# Patient Record
Sex: MALE | Race: WHITE | NOT HISPANIC OR LATINO | Employment: FULL TIME | URBAN - METROPOLITAN AREA
[De-identification: names, ages, dates, MRNs, and addresses within clinical notes are randomized per-mention and may not be internally consistent; named-entity substitution may affect disease eponyms.]

---

## 2017-01-10 ENCOUNTER — ALLSCRIPTS OFFICE VISIT (OUTPATIENT)
Dept: OTHER | Facility: OTHER | Age: 62
End: 2017-01-10

## 2017-01-10 ENCOUNTER — TRANSCRIBE ORDERS (OUTPATIENT)
Dept: ADMINISTRATIVE | Facility: HOSPITAL | Age: 62
End: 2017-01-10

## 2017-01-10 DIAGNOSIS — H53.2 DIPLOPIA: Primary | ICD-10-CM

## 2017-01-10 DIAGNOSIS — H51.0 PALSY OF CONJUGATE GAZE: ICD-10-CM

## 2017-01-11 ENCOUNTER — TRANSCRIBE ORDERS (OUTPATIENT)
Dept: ADMINISTRATIVE | Facility: HOSPITAL | Age: 62
End: 2017-01-11

## 2017-01-11 DIAGNOSIS — H53.2 DIPLOPIA: Primary | ICD-10-CM

## 2017-01-11 DIAGNOSIS — H51.0 PALSY OF CONJUGATE GAZE: ICD-10-CM

## 2017-01-30 ENCOUNTER — GENERIC CONVERSION - ENCOUNTER (OUTPATIENT)
Dept: OTHER | Facility: OTHER | Age: 62
End: 2017-01-30

## 2017-08-28 ENCOUNTER — GENERIC CONVERSION - ENCOUNTER (OUTPATIENT)
Dept: OTHER | Facility: OTHER | Age: 62
End: 2017-08-28

## 2018-01-11 NOTE — MISCELLANEOUS
Message   Date: 28 Mar 2016 8:52 AM EST, Recorded By: Lionel Ang   Calling For: Rafa Osullivan   Caller: Andrew Jackson, Self   Phone: [phone] (Home), [phone] (Work)   Reason: Medical Complaint   left knee pain persists, refer to [facility], Dr Quyen Meraz, patient to call for an appointment        Active Problems    1  BPH without obstruction/lower urinary tract symptoms (600 00) (N40 0)   2  Cough (786 2) (R05)   3  Diverticulitis (562 11) (K57 92)   4  Hyperlipidemia (272 4) (E78 5)   5  Joint pain, knee (719 46) (M25 569)   6  Lateral epicondylitis of left elbow (726 32) (M77 12)   7  Psoriasis (696 1) (L40 9)   8  Rhinitis (472 0) (J31 0)   9  Screening for colon cancer (V76 51) (Z12 11)   10  Upper respiratory infection (465 9) (J06 9)    Current Meds   1  Fluticasone Propionate 50 MCG/ACT Nasal Suspension; INHALE 2 SPRAYS INTO   EACH NOSTRIL ONE TIME DAILY; Therapy: 88DNS7980 to (Evaluate:13Oct2015)  Requested for: 54WBA6638; Last   Rx:15Jun2015 Ordered   2  Simvastatin 20 MG Oral Tablet; TAKE ONE TABLET BY MOUTH ONCE DAILY AS   DIRECTED; Therapy: 53Mco7489 to (Evaluate:31Mar2016)  Requested for: 05ESI5602; Last   Rx:01Mar2016 Ordered   3  Tamsulosin HCl - 0 4 MG Oral Capsule; TAKE 1 CAPSULE BY MOUTH ONE TIME DAILY; Therapy: 57Hzy6755 to (Evaluate:31Mar2016)  Requested for: 74NWU5049; Last   Rx:01Mar2016 Ordered    Allergies    1   No Known Drug Allergies    Plan  Joint pain, knee    · 2 - Chad HUMPHREYS, Nubia White  (Orthopedic Surgery) Physician Referral  Consult  Status: Active   Requested for: [id number]  Care Summary provided  : Yes    Signatures   Electronically signed by : ADRIANNA Raymond ; Mar 30 2016  8:58AM EST                       (Author)

## 2018-01-15 VITALS
RESPIRATION RATE: 15 BRPM | WEIGHT: 237.5 LBS | TEMPERATURE: 97 F | HEART RATE: 60 BPM | HEIGHT: 70 IN | SYSTOLIC BLOOD PRESSURE: 130 MMHG | DIASTOLIC BLOOD PRESSURE: 80 MMHG | BODY MASS INDEX: 34 KG/M2

## 2018-01-16 NOTE — MISCELLANEOUS
Provider Comments  Provider Comments:   Patient NO SHOW on 8/28/17 at 2:30 pm letter sent to patient        Signatures   Electronically signed by : ADRIANNA David ; Sep  5 2017 12:37PM EST                       (Author)

## 2018-01-16 NOTE — MISCELLANEOUS
Message   Date: 31 Oct 2016 3:23 PM EST, Recorded By: Jaky Mae For: AbranRafa   Caller: Archie Lazo   Phone: [phone] (Home), [phone] (Work)   Reason: General Medical Question   Patient called stating that he was in bed Friday and Saturday with the pain but on Sunday was able to get up and about  Today able to walk, pain improved a little  He still has numbness in his hands  He was wondering if he may try Physical Therapy? As per Dr Ubaldo Suarez, we will give him an order for physical Therapy  Patient understood        Active Problems    1  BPH without obstruction/lower urinary tract symptoms (600 00) (N40 0)   2  Cervical radiculopathy, acute (723 4) (M54 12)   3  Cough (786 2) (R05)   4  Diverticulitis (562 11) (K57 92)   5  Hyperlipidemia (272 4) (E78 5)   6  Joint pain, knee (719 46) (M25 569)   7  Lateral epicondylitis of left elbow (726 32) (M77 12)   8  Need for prophylactic vaccination and inoculation against influenza (V04 81) (Z23)   9  Psoriasis (696 1) (L40 9)   10  Rhinitis (472 0) (J31 0)   11  Screening for colon cancer (V76 51) (Z12 11)   12  Strain of trapezius muscle (840 8) (S46 819A)   13  Upper respiratory infection (465 9) (J06 9)    Current Meds   1  Diclofenac Sodium 75 MG Oral Tablet Delayed Release; Take one tablet bid; Therapy: 43ZGG5663 to (Evaluate:15Ebu2401)  Requested for: 10JYI2883; Last   Rx:27Oct2016 Ordered   2  Methocarbamol 500 MG Oral Tablet; 1 TABLET QID PRN; Therapy: 36DJR5883 to (Marzella Close)  Requested for: 31POZ4054; Last   Rx:27Oct2016 Ordered   3  MethylPREDNISolone 4 MG Oral Tablet Therapy Pack; take as directed; Therapy: 89UBY4268 to (Last Pleasant Broody)  Requested for: 27Oct2016 Ordered   4  Simvastatin 20 MG Oral Tablet; TAKE 1 TABLET BY MOUTH EVERY DAY AS DIRECTED; Therapy: 67Sli5628 to (Evaluate:44Xda0394)  Requested for: 91IFU2070; Last   Rx:14Jun2016 Ordered   5   Tamsulosin HCl - 0 4 MG Oral Capsule; TAKE 1 CAPSULE BY MOUTH EVERY DAY; Therapy: 56Hnf9706 to (Evaluate:54Dpv1557)  Requested for: 30TEV1786; Last   Rx:14Jun2016 Ordered   6  TraMADol HCl - 50 MG Oral Tablet; TAKE 1 TABLET 3 TIMES DAILY AS NEEDED; Therapy: 76QCO2301 to (Evaluate:19Oct2016); Last Rx:12Oct2016 Ordered    Allergies    1   No Known Drug Allergies    Signatures   Electronically signed by : ADRIANNA Ochoa ; Nov 1 2016  8:11AM EST                       (Author)

## 2018-02-12 ENCOUNTER — OFFICE VISIT (OUTPATIENT)
Dept: INTERNAL MEDICINE CLINIC | Facility: CLINIC | Age: 63
End: 2018-02-12
Payer: COMMERCIAL

## 2018-02-12 VITALS
RESPIRATION RATE: 15 BRPM | WEIGHT: 236.4 LBS | HEIGHT: 70 IN | HEART RATE: 76 BPM | TEMPERATURE: 97.3 F | DIASTOLIC BLOOD PRESSURE: 84 MMHG | BODY MASS INDEX: 33.84 KG/M2 | SYSTOLIC BLOOD PRESSURE: 138 MMHG

## 2018-02-12 DIAGNOSIS — N40.0 BPH WITHOUT OBSTRUCTION/LOWER URINARY TRACT SYMPTOMS: ICD-10-CM

## 2018-02-12 DIAGNOSIS — Z12.11 ENCOUNTER FOR SCREENING COLONOSCOPY FOR NON-HIGH-RISK PATIENT: ICD-10-CM

## 2018-02-12 DIAGNOSIS — E78.5 HYPERLIPIDEMIA, UNSPECIFIED HYPERLIPIDEMIA TYPE: Primary | ICD-10-CM

## 2018-02-12 PROCEDURE — 99214 OFFICE O/P EST MOD 30 MIN: CPT | Performed by: INTERNAL MEDICINE

## 2018-02-12 RX ORDER — SIMVASTATIN 20 MG
1 TABLET ORAL DAILY
COMMUNITY
Start: 2011-08-02 | End: 2018-09-10 | Stop reason: SDUPTHER

## 2018-02-12 RX ORDER — TAMSULOSIN HYDROCHLORIDE 0.4 MG/1
1 CAPSULE ORAL DAILY
COMMUNITY
Start: 2011-08-02 | End: 2018-05-13 | Stop reason: SDUPTHER

## 2018-02-12 NOTE — PROGRESS NOTES
[facility] [place]      NAME: Diaz Castro  AGE: 65 y o  SEX: male  : 1955   MRN: [id number]    DATE: 2018  TIME: 3:03 PM    Assessment and Plan   1  Hyperlipidemia, unspecified hyperlipidemia type  The patient is tolerating simvastatin  He will get a lipid profile in the near future  - CBC  - Comprehensive metabolic panel  - Lipid panel    2  BPH without obstruction/lower urinary tract symptoms   his symptoms are well controlled on tamsulosin  - PSA; Future    3  Encounter for screening colonoscopy for non-high-risk patient   the future is now  The patient has been referred to Gastroenterology for colonoscopy  I encouraged him to expedite this  - Ambulatory referral to Gastroenterology; Future      The patient is doing generally well  He has some musculoskeletal issues but these have not really slowed him down  He does not want any particular evaluation or additional treatment for this at the moment  He is on simvastatin for his cholesterol  He is on tamsulosin for BPH  I encouraged him to update his lab work and to undergo colorectal cancer screening  Return to office in:   1 year    Chief Complaint    routine follow-up    History of Present Illness      the patient returned to the office for routine re-evaluation of hyperlipidemia and BPH  Since his last visit he has been feeling reasonably well  He has difficulty with his left knee and saw Orthopedics  He was advised to stop running  He has done so  He does have a rowing machine but does not use it very frequently  He has been having some discomfort in his left hip which she thinks is bursitis  This is not really been holding him back  He has been under some psychological stress of late      The following portions of the patient's history were reviewed and updated as appropriate: allergies, current medications, past family history, past medical history, past social history, past surgical history and problem list     Review of Systems   Review of Systems   Constitutional: Negative  HENT: Negative for congestion, ear pain, postnasal drip, rhinorrhea, sore throat and trouble swallowing  Eyes: Negative for pain, discharge, redness and visual disturbance  Respiratory: Negative for cough, shortness of breath and wheezing  Cardiovascular: Negative  Gastrointestinal: Negative  Endocrine: Negative  Genitourinary: Negative for difficulty urinating, dysuria, frequency, hematuria and urgency  Musculoskeletal: Negative for arthralgias, gait problem, joint swelling and myalgias  Skin: Negative for rash  Neurological: Negative for dizziness, speech difficulty, weakness, light-headedness, numbness and headaches  Hematological: Negative  Psychiatric/Behavioral: Negative for confusion, decreased concentration, dysphoric mood and sleep disturbance  The patient is not nervous/anxious  Active Problem List     Patient Active Problem List   Diagnosis    BPH without obstruction/lower urinary tract symptoms    Hyperlipidemia       Objective   /84 (BP Location: Left arm, Patient Position: Sitting, Cuff Size: Standard)   Pulse 76   Temp (!) 97 3 °F (36 3 °C) (Tympanic)   Resp 15   Ht 5' 10" (1 778 m)   Wt 107 kg (236 lb 6 4 oz)   BMI 33 92 kg/m²     Physical Exam   Constitutional: He is oriented to person, place, and time  He appears well-developed and well-nourished  HENT:   Head: Normocephalic and atraumatic  Neck: Neck supple  No JVD present  No tracheal deviation present  No thyromegaly present  Cardiovascular: Normal rate, regular rhythm, normal heart sounds and intact distal pulses  Exam reveals no gallop and no friction rub  No murmur heard  Pulmonary/Chest: Effort normal and breath sounds normal  He has no wheezes  He has no rales  He exhibits no tenderness  Abdominal: Soft  Bowel sounds are normal  He exhibits no distension and no mass  There is no tenderness   There is no rebound  Musculoskeletal: Normal range of motion  He exhibits no edema or tenderness  Lymphadenopathy:     He has no cervical adenopathy  Neurological: He is alert and oriented to person, place, and time  Skin: Skin is warm and dry  Psychiatric: He has a normal mood and affect   His behavior is normal  Judgment and thought content normal        Pertinent Laboratory/Diagnostic Studies:   no recent labs    Current Medications     Current Outpatient Prescriptions:     simvastatin (ZOCOR) 20 mg tablet, Take 1 tablet by mouth daily, Disp: , Rfl:     tamsulosin (FLOMAX) 0 4 mg, Take 1 capsule by mouth daily, Disp: , Rfl:       Ajay Pino MD

## 2018-05-09 ENCOUNTER — OFFICE VISIT (OUTPATIENT)
Dept: INTERNAL MEDICINE CLINIC | Facility: CLINIC | Age: 63
End: 2018-05-09
Payer: COMMERCIAL

## 2018-05-09 VITALS
BODY MASS INDEX: 34.5 KG/M2 | DIASTOLIC BLOOD PRESSURE: 86 MMHG | WEIGHT: 241 LBS | TEMPERATURE: 98.1 F | SYSTOLIC BLOOD PRESSURE: 131 MMHG | HEART RATE: 55 BPM | RESPIRATION RATE: 15 BRPM | HEIGHT: 70 IN

## 2018-05-09 DIAGNOSIS — Z01.818 PRE-OP EVALUATION: Primary | ICD-10-CM

## 2018-05-09 DIAGNOSIS — E78.5 HYPERLIPIDEMIA, UNSPECIFIED HYPERLIPIDEMIA TYPE: ICD-10-CM

## 2018-05-09 DIAGNOSIS — H53.2 DIPLOPIA: ICD-10-CM

## 2018-05-09 DIAGNOSIS — Z23 NEED FOR PNEUMOCOCCAL VACCINATION: ICD-10-CM

## 2018-05-09 DIAGNOSIS — N40.0 BPH WITHOUT OBSTRUCTION/LOWER URINARY TRACT SYMPTOMS: ICD-10-CM

## 2018-05-09 PROBLEM — M17.10 OSTEOARTHRITIS OF KNEE: Status: ACTIVE | Noted: 2018-05-09

## 2018-05-09 PROBLEM — M25.552 LEFT HIP PAIN: Status: ACTIVE | Noted: 2018-05-09

## 2018-05-09 PROCEDURE — 93000 ELECTROCARDIOGRAM COMPLETE: CPT | Performed by: INTERNAL MEDICINE

## 2018-05-09 PROCEDURE — 99243 OFF/OP CNSLTJ NEW/EST LOW 30: CPT | Performed by: INTERNAL MEDICINE

## 2018-05-09 NOTE — PROGRESS NOTES
Dear Dr Dorothy Contreras,      At your kind request I evaluated Naomie Beth in medical consultation in preparation for left eye muscle surgery    As you know, the patient is a 65 y o  male has had diplopia related to weakness of his left ocular muscles  Surgery is planned to correct this situation  The patient has been feeling generally well  He has had no chest pain, shortness of breath, palpitations, or dizziness  He has no cough, wheezing, or sputum production  He has been walking 7 miles daily  The patient's past medical history is positive for hypercholesterolemia, BPH, and osteoarthritis of the knees  He has no history of diabetes, coronary artery disease, hypertension, peptic ulcer disease, or kidney disease  The patient's medications include:  Simvastatin 20 mg daily  Tamsulosin 0 4 mg daily    The patient has no known allergies    Family history is noncontributory    Social history reveals the patient does not smoke nor has he ever smoked  He drinks ethanol in moderation  He uses no illicit drugs  Review of Systems:  Review of systems is taken in detail  Except for those issues mentioned above, a 12 system review was negative with the exception that the patient is having some left hip pain  He has a prominence of the soft tissue around that hip  He says that his right leg is somewhat shorter than his left as a result of an old injury  This is been going on for at least several months  Vitals:    05/09/18 1019   BP: 131/86   Pulse: 55   Resp: 15   Temp: 98 1 °F (36 7 °C)         Physical Exam:  The patient is a well-developed, well-nourished man who appears in no distress  Head is atraumatic and normocephalic  ENT examination is within normal limits  The neck is supple  Carotids are full without bruits  There is no lymphadenopathy or goiter  Lungs are clear to auscultation and percussion  There is no wheezing, rales, or rhonchi  Cardiac exam reveals a regular rhythm    There is no murmur, gallop, or rub  The abdomen is soft with active bowel sounds  There is no mass, tenderness, or organomegaly  Extremities showed no clubbing, cyanosis, or edema  There was some prominence of the muscles of all lateral to the left hip  There was no specific tenderness  Range of motion was preserved  Peripheral pulses are intact  Neurologic examination revealed the patient to be alert and oriented  No focal or lateralizing sign is present  Electrocardiogram was done in the office which appeared normal   The computer actually interpreted the tracing is showing an inferior wall MI  However, I believe that small R-waves are present in the inferior leads, making the reading of an inferior wall MI less likely  Assessment:   1  Ocular muscle weakness, for corrective surgery  2  Hyperlipidemia, well controlled  3  BPH, stable on tamsulosin  4  Left hip pain    Recommendations: The patient appears to be stable for surgery as proposed  I do not believe that he needs any additional preoperative evaluation or any special preparation from medical standpoint  I told him to withhold his medications on the day of the procedure in resume them postop  The patient plans to seek orthopedic consultation for his left hip symptoms when he recovers from his eye surgery  I hope that this information is helpful to you  Should you have any questions please do not hesitate to contact me      Sincerely,  Miguel Rodriguez MD

## 2018-05-13 DIAGNOSIS — R35.0 BENIGN PROSTATIC HYPERPLASIA WITH URINARY FREQUENCY: Primary | ICD-10-CM

## 2018-05-13 DIAGNOSIS — N40.1 BENIGN PROSTATIC HYPERPLASIA WITH URINARY FREQUENCY: Primary | ICD-10-CM

## 2018-05-15 RX ORDER — TAMSULOSIN HYDROCHLORIDE 0.4 MG/1
CAPSULE ORAL
Qty: 30 CAPSULE | Refills: 0 | Status: SHIPPED | OUTPATIENT
Start: 2018-05-15 | End: 2018-06-20 | Stop reason: SDUPTHER

## 2018-06-20 DIAGNOSIS — N40.1 BENIGN PROSTATIC HYPERPLASIA WITH URINARY FREQUENCY: ICD-10-CM

## 2018-06-20 DIAGNOSIS — R35.0 BENIGN PROSTATIC HYPERPLASIA WITH URINARY FREQUENCY: ICD-10-CM

## 2018-06-20 RX ORDER — TAMSULOSIN HYDROCHLORIDE 0.4 MG/1
CAPSULE ORAL
Qty: 30 CAPSULE | Refills: 0 | Status: SHIPPED | OUTPATIENT
Start: 2018-06-20 | End: 2018-08-16 | Stop reason: SDUPTHER

## 2018-08-16 DIAGNOSIS — N40.1 BENIGN PROSTATIC HYPERPLASIA WITH URINARY FREQUENCY: ICD-10-CM

## 2018-08-16 DIAGNOSIS — R35.0 BENIGN PROSTATIC HYPERPLASIA WITH URINARY FREQUENCY: ICD-10-CM

## 2018-08-16 RX ORDER — TAMSULOSIN HYDROCHLORIDE 0.4 MG/1
CAPSULE ORAL
Qty: 30 CAPSULE | Refills: 0 | Status: SHIPPED | OUTPATIENT
Start: 2018-08-16 | End: 2018-09-16 | Stop reason: SDUPTHER

## 2018-08-28 ENCOUNTER — OFFICE VISIT (OUTPATIENT)
Dept: INTERNAL MEDICINE CLINIC | Facility: CLINIC | Age: 63
End: 2018-08-28
Payer: COMMERCIAL

## 2018-08-28 VITALS
HEIGHT: 70 IN | BODY MASS INDEX: 32.78 KG/M2 | WEIGHT: 229 LBS | TEMPERATURE: 97.5 F | RESPIRATION RATE: 16 BRPM | HEART RATE: 68 BPM | DIASTOLIC BLOOD PRESSURE: 82 MMHG | SYSTOLIC BLOOD PRESSURE: 124 MMHG

## 2018-08-28 DIAGNOSIS — L98.9 SKIN LESION: ICD-10-CM

## 2018-08-28 DIAGNOSIS — E78.5 HYPERLIPIDEMIA, UNSPECIFIED HYPERLIPIDEMIA TYPE: Primary | ICD-10-CM

## 2018-08-28 DIAGNOSIS — M65.322 TRIGGER INDEX FINGER OF LEFT HAND: ICD-10-CM

## 2018-08-28 DIAGNOSIS — N40.0 BPH WITHOUT OBSTRUCTION/LOWER URINARY TRACT SYMPTOMS: ICD-10-CM

## 2018-08-28 PROCEDURE — 99214 OFFICE O/P EST MOD 30 MIN: CPT | Performed by: INTERNAL MEDICINE

## 2018-08-28 PROCEDURE — 3008F BODY MASS INDEX DOCD: CPT | Performed by: INTERNAL MEDICINE

## 2018-08-28 PROCEDURE — 1036F TOBACCO NON-USER: CPT | Performed by: INTERNAL MEDICINE

## 2018-08-29 NOTE — PROGRESS NOTES
[facility] [place]      NAME: Keyona Castro  AGE: 65 y o  SEX: male  : 1955   MRN: [id number]    DATE: 2018  TIME: 11:22 AM    Assessment and Plan   1  Hyperlipidemia, unspecified hyperlipidemia type  Stable on simvastatin  2  BPH without obstruction/lower urinary tract symptoms  Symptoms are adequately controlled with tamsulosin  3  Trigger index finger of left hand  Will need orthopedic evaluation  4  Skin lesion  The patient's lesions do not appear to be melanoma but maybe basilar squamous cell skin cancer  He will need dermatologic evaluation  The patient has relocated to [facility]  He intends to establish a new primary care relationship there in the near future  Logistically, it would be much easier for him to see specialist in the [facility] area  I will contact his new primary care provider to arrange appropriate referrals  Return to office in:   P r n  Chief Complaint     Chief Complaint   Patient presents with    trigger finger left index       History of Present Illness     The patient came to the office for re-evaluation of hyperlipidemia, BPH, and for evaluation of skin lesions on his face and clicking in his left index finger  He has been feeling generally well  He has been tolerating his medications  He has had extensive sun exposure over the years  He is notice lesions on the upper part of both cheeks  He thinks that the 1 on the right has grown somewhat lately  He also notices that he has a clicking or popping in his left index finger  Sometimes when he wakes up it is stuck in a flexed position and he has to uses other hand to straighten it  When he does so, he will hear a pop  Overall, it is not giving him much pain  He has not suffered any injury  He has had no chest pain, shortness of breath, palpitations, or dizziness      The following portions of the patient's history were reviewed and updated as appropriate: allergies, current medications, past family history, past medical history, past social history, past surgical history and problem list     Review of Systems   Review of Systems   Constitutional: Negative  HENT: Negative for congestion, ear pain, postnasal drip, rhinorrhea, sore throat and trouble swallowing  Eyes: Negative for pain, discharge, redness and visual disturbance  Respiratory: Negative for cough, shortness of breath and wheezing  Cardiovascular: Negative  Gastrointestinal: Negative  Endocrine: Negative  Genitourinary: Negative for difficulty urinating, dysuria, frequency, hematuria and urgency  Musculoskeletal: Negative for arthralgias, gait problem, joint swelling and myalgias  Skin: Negative for rash  Neurological: Negative for dizziness, speech difficulty, weakness, light-headedness, numbness and headaches  Hematological: Negative  Psychiatric/Behavioral: Negative for confusion, decreased concentration, dysphoric mood and sleep disturbance  The patient is not nervous/anxious  Active Problem List     Patient Active Problem List   Diagnosis    BPH without obstruction/lower urinary tract symptoms    Hyperlipidemia    Osteoarthritis of knee    Diplopia    Left hip pain    Skin lesion    Trigger index finger of left hand       Objective   /82 (BP Location: Left arm, Patient Position: Sitting, Cuff Size: Standard)   Pulse 68   Temp 97 5 °F (36 4 °C) (Tympanic)   Resp 16   Ht 5' 10" (1 778 m)   Wt 104 kg (229 lb)   BMI 32 86 kg/m²     Physical Exam   Constitutional: He is oriented to person, place, and time  He appears well-developed and well-nourished  No distress  HENT:   Head: Normocephalic and atraumatic  Neck: Neck supple  No JVD present  No tracheal deviation present  No thyromegaly present  Cardiovascular: Normal rate, regular rhythm, normal heart sounds and intact distal pulses  Exam reveals no gallop and no friction rub      No murmur heard   Pulmonary/Chest: Effort normal and breath sounds normal  He has no wheezes  He has no rales  He exhibits no tenderness  Abdominal: Soft  Bowel sounds are normal  He exhibits no distension and no mass  There is no tenderness  There is no rebound  Musculoskeletal: Normal range of motion  He exhibits no edema or tenderness  Lymphadenopathy:     He has no cervical adenopathy  Neurological: He is alert and oriented to person, place, and time  Skin: Skin is warm and dry  Psychiatric: He has a normal mood and affect  Pertinent Laboratory/Diagnostic Studies:  No visits with results within 3 Month(s) from this visit     Latest known visit with results is:   Appointment on 12/13/2016   Component Date Value Ref Range Status    WBC 12/13/2016 5 17  4 31 - 10 16 Thousand/uL Final    RBC 12/13/2016 4 57  3 88 - 5 62 Million/uL Final    Hemoglobin 12/13/2016 15 0  12 0 - 17 0 g/dL Final    Hematocrit 12/13/2016 42 5  36 5 - 49 3 % Final    MCV 12/13/2016 93  82 - 98 fL Final    MCH 12/13/2016 32 8  26 8 - 34 3 pg Final    MCHC 12/13/2016 35 3  31 4 - 37 4 g/dL Final    RDW 12/13/2016 13 5  11 6 - 15 1 % Final    MPV 12/13/2016 10 5  8 9 - 12 7 fL Final    Platelets 43/93/3741 145* 149 - 390 Thousands/uL Final    nRBC 12/13/2016 0  /100 WBCs Final    Neutrophils Relative 12/13/2016 52  43 - 75 % Final    Lymphocytes Relative 12/13/2016 34  14 - 44 % Final    Monocytes Relative 12/13/2016 9  4 - 12 % Final    Eosinophils Relative 12/13/2016 5  0 - 6 % Final    Basophils Relative 12/13/2016 0  0 - 1 % Final    Neutrophils Absolute 12/13/2016 2 67  1 85 - 7 62 Thousands/µL Final    Lymphocytes Absolute 12/13/2016 1 78  0 60 - 4 47 Thousands/µL Final    Monocytes Absolute 12/13/2016 0 47  0 17 - 1 22 Thousand/µL Final    Eosinophils Absolute 12/13/2016 0 24  0 00 - 0 61 Thousand/µL Final    Basophils Absolute 12/13/2016 0 01  0 00 - 0 10 Thousands/µL Final    Sodium 12/13/2016 140  136 - 145 mmol/L Final    Potassium 12/13/2016 4 2  3 5 - 5 3 mmol/L Final    Chloride 12/13/2016 104  100 - 108 mmol/L Final    CO2 12/13/2016 29  21 - 32 mmol/L Final    ANION GAP 12/13/2016 7  4 - 13 mmol/L Final    BUN 12/13/2016 17  5 - 25 mg/dL Final    Creatinine 12/13/2016 1 18  0 60 - 1 30 mg/dL Final    Glucose 12/13/2016 99  65 - 140 mg/dL Final    Calcium 12/13/2016 8 9  8 3 - 10 1 mg/dL Final    AST 12/13/2016 24  5 - 45 U/L Final    ALT 12/13/2016 35  12 - 78 U/L Final    Alkaline Phosphatase 12/13/2016 57  46 - 116 U/L Final    Total Protein 12/13/2016 7 3  6 4 - 8 2 g/dL Final    Albumin 12/13/2016 4 0  3 5 - 5 0 g/dL Final    Total Bilirubin 12/13/2016 0 68  0 20 - 1 00 mg/dL Final    eGFR 12/13/2016 >60 0  ml/min/1 73sq m Final    Cholesterol 12/13/2016 167  50 - 200 mg/dL Final    Triglycerides 12/13/2016 148  <=150 mg/dL Final    HDL, Direct 12/13/2016 40  40 - 60 mg/dL Final    LDL Calculated 12/13/2016 97  0 - 100 mg/dL Final    Testosterone 12/13/2016 313 1  241 - 827 ng/dL Final    Prolactin 12/13/2016 3 6  2 5 - 17 4 ng/mL Final           Current Medications     Current Outpatient Prescriptions:     simvastatin (ZOCOR) 20 mg tablet, Take 1 tablet by mouth daily, Disp: , Rfl:     tamsulosin (FLOMAX) 0 4 mg, TAKE 1 CAPSULE BY MOUTH EVERY DAY, Disp: 30 capsule, Rfl: 0      Roxanne Flannery MD

## 2018-09-10 DIAGNOSIS — E78.00 HYPERCHOLESTEROLEMIA: Primary | ICD-10-CM

## 2018-09-11 RX ORDER — SIMVASTATIN 20 MG
TABLET ORAL
Qty: 30 TABLET | Refills: 3 | Status: SHIPPED | OUTPATIENT
Start: 2018-09-11

## 2018-09-16 DIAGNOSIS — N40.1 BENIGN PROSTATIC HYPERPLASIA WITH URINARY FREQUENCY: ICD-10-CM

## 2018-09-16 DIAGNOSIS — R35.0 BENIGN PROSTATIC HYPERPLASIA WITH URINARY FREQUENCY: ICD-10-CM

## 2018-09-19 RX ORDER — TAMSULOSIN HYDROCHLORIDE 0.4 MG/1
CAPSULE ORAL
Qty: 30 CAPSULE | Refills: 0 | Status: SHIPPED | OUTPATIENT
Start: 2018-09-19